# Patient Record
(demographics unavailable — no encounter records)

---

## 2025-04-21 NOTE — HISTORY OF PRESENT ILLNESS
[FreeTextEntry1] : Right small finger PIP fx dislocation DOI 3/19/25  Patient is here with her father.  HPI: Patient reports on 3/19/25 she jammed the right small finger on a basketball during practice. She noticed swelling immediately. States she was seen at an urgent care and had x-rays which they were told showed a fracture. She was given an aluminum splint which she has been wearing every day. She states her pain has improved but she occasionally has sharp pains with ROM. Denies numbness and tingling.  Interval Hx 4/21/25: Here for ROM check with dad. Reports no pain.

## 2025-04-21 NOTE — PHYSICAL EXAM
[de-identified] : Right small finger Full, painless ROM No swelling NTTP PIP  FDS and FDP intact NO malrotation [de-identified] : 3 views of the right small finger obtained in office today, reviewed by Dr. Hooper, demonstrate right SF SH2 minimally displaced volar base fracture of P2. Congruent PIP joint.

## 2025-04-21 NOTE — ASSESSMENT
[FreeTextEntry1] : 11 year old female 4.5 weeks out from a right small finger PIP fracture dislocation. NO stiffness, pain or swelling. Return to all activates. F/u PRN.   I have spent 25 minutes of time on the encounter which excludes teaching and/or separately reported services